# Patient Record
Sex: MALE | Race: WHITE | NOT HISPANIC OR LATINO
[De-identification: names, ages, dates, MRNs, and addresses within clinical notes are randomized per-mention and may not be internally consistent; named-entity substitution may affect disease eponyms.]

---

## 2020-04-26 ENCOUNTER — MESSAGE (OUTPATIENT)
Age: 55
End: 2020-04-26

## 2021-03-23 PROBLEM — Z00.00 ENCOUNTER FOR PREVENTIVE HEALTH EXAMINATION: Status: ACTIVE | Noted: 2021-03-23

## 2021-03-31 ENCOUNTER — APPOINTMENT (OUTPATIENT)
Dept: PULMONOLOGY | Facility: CLINIC | Age: 56
End: 2021-03-31
Payer: COMMERCIAL

## 2021-03-31 ENCOUNTER — NON-APPOINTMENT (OUTPATIENT)
Age: 56
End: 2021-03-31

## 2021-03-31 VITALS
HEIGHT: 70 IN | SYSTOLIC BLOOD PRESSURE: 129 MMHG | HEART RATE: 77 BPM | BODY MASS INDEX: 1.43 KG/M2 | WEIGHT: 10 LBS | DIASTOLIC BLOOD PRESSURE: 85 MMHG | RESPIRATION RATE: 16 BRPM | TEMPERATURE: 98.3 F

## 2021-03-31 DIAGNOSIS — R06.83 SNORING: ICD-10-CM

## 2021-03-31 DIAGNOSIS — Z81.8 FAMILY HISTORY OF OTHER MENTAL AND BEHAVIORAL DISORDERS: ICD-10-CM

## 2021-03-31 DIAGNOSIS — F51.04 PSYCHOPHYSIOLOGIC INSOMNIA: ICD-10-CM

## 2021-03-31 DIAGNOSIS — Z86.59 PERSONAL HISTORY OF OTHER MENTAL AND BEHAVIORAL DISORDERS: ICD-10-CM

## 2021-03-31 DIAGNOSIS — Z83.79 FAMILY HISTORY OF OTHER DISEASES OF THE DIGESTIVE SYSTEM: ICD-10-CM

## 2021-03-31 DIAGNOSIS — F41.1 GENERALIZED ANXIETY DISORDER: ICD-10-CM

## 2021-03-31 DIAGNOSIS — Z82.49 FAMILY HISTORY OF ISCHEMIC HEART DISEASE AND OTHER DISEASES OF THE CIRCULATORY SYSTEM: ICD-10-CM

## 2021-03-31 PROCEDURE — 99072 ADDL SUPL MATRL&STAF TM PHE: CPT

## 2021-03-31 PROCEDURE — 99204 OFFICE O/P NEW MOD 45 MIN: CPT | Mod: GC

## 2021-03-31 RX ORDER — FINASTERIDE 5 MG/1
5 TABLET, FILM COATED ORAL
Refills: 0 | Status: ACTIVE | COMMUNITY

## 2021-03-31 RX ORDER — ROSUVASTATIN CALCIUM 5 MG/1
TABLET, FILM COATED ORAL
Refills: 0 | Status: ACTIVE | COMMUNITY

## 2021-03-31 RX ORDER — DULOXETINE HYDROCHLORIDE 60 MG/1
60 CAPSULE, DELAYED RELEASE ORAL
Refills: 0 | Status: ACTIVE | COMMUNITY

## 2021-03-31 RX ORDER — ALPRAZOLAM 2 MG/1
2 TABLET, EXTENDED RELEASE ORAL
Refills: 0 | Status: ACTIVE | COMMUNITY

## 2021-03-31 RX ORDER — PRAZOSIN HYDROCHLORIDE 5 MG/1
CAPSULE ORAL
Refills: 0 | Status: ACTIVE | COMMUNITY

## 2021-03-31 RX ORDER — ALPRAZOLAM 1 MG/1
1 TABLET ORAL
Refills: 0 | Status: ACTIVE | COMMUNITY

## 2021-03-31 NOTE — PHYSICAL EXAM
[General Appearance - Well Developed] : well developed [General Appearance - Well Nourished] : well nourished [Neck Appearance] : the appearance of the neck was normal [Heart Rate And Rhythm] : heart rate was normal and rhythm regular [Heart Sounds] : normal S1 and S2 [Auscultation Breath Sounds / Voice Sounds] : lungs were clear to auscultation bilaterally [] : no respiratory distress [Abnormal Walk] : normal gait [Nail Clubbing] : no clubbing of the fingernails [Cyanosis, Localized] : no localized cyanosis [Skin Color & Pigmentation] : normal skin color and pigmentation [Motor Exam] : the motor exam was normal [No Focal Deficits] : no focal deficits [Oriented To Time, Place, And Person] : oriented to person, place, and time [Mood] : the mood was normal

## 2021-04-01 PROBLEM — F51.04 CHRONIC INSOMNIA: Status: ACTIVE | Noted: 2021-03-31

## 2021-04-01 RX ORDER — LEMBOREXANT 5 MG/1
5 TABLET, FILM COATED ORAL
Qty: 10 | Refills: 0 | Status: ACTIVE | COMMUNITY
Start: 2021-04-01 | End: 1900-01-01

## 2021-04-01 NOTE — ASSESSMENT
[FreeTextEntry1] : 55M hx chronic insomnia, HYUN, depression, who comes for initial evaluation. 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 Also c/o PTSD nightmares which occur at the end of the night. He has been seen by Dr. Nile Sotelo (Psychiatry), who has been treating his anxiety and depression. Currently he is on cymbalta 60mg, belsomra 20 mg, prazosin 6mg and xanax. Pt finds Belsomra 20mg extremely helpful to help him fall asleep. In the past, tried doxepin and trazodone but did not help. Given confusional arousals and night terrors would avoid "Z drugs". Additionally would avoid other drugs that may interact with his current regimen of xanax, cymbalta and prazosin including trazodone and mirtazapine (both of which can increase serotonin levels). No additional benzodiazepines should be given at this time since patient is already on xanax. Belsomra is ideal choice as it does not interact significantly with his current regimen, specifically targets orexin receptors, and has little adverse effects. At this time, d/w pt CBTi with Dr. Quintero would be helpful in both chronic insomnia and PTSD nightmares  - he is reluctant to pursue because of insurance coverage issues for CBTi  - he was also provided with the name of an alternative CBTi provider in the region who may accept his insurance. Will reorder Belsomra 20mg at bedtime. If not accepted by insurance, will try dayvigo. he will conitnue with ptsd management as per Dr. Sotelo

## 2021-04-01 NOTE — REVIEW OF SYSTEMS
[Depression] : depression [Anxious] : anxious [Difficulty Initiating Sleep] : difficulty falling asleep [Difficulty Maintaining Sleep] : difficulty maintaining sleep [Unusual Sleep Behavior] : unusual sleep behavior [Negative] : Musculoskeletal [Lower Extremity Discomfort] : no lower extremity discomfort [Late day/ Evening symptoms] : no late day/evening symptoms [Hypersomnolence] : not sleeping much more than usual [Cataplexy] :  no cataplexy

## 2021-04-01 NOTE — HISTORY OF PRESENT ILLNESS
[FreeTextEntry1] : 55M hx chronic insomnia, HYUN, depression, who comes for initial evaluation. Referred by Dr. Sotelo (psychiatry). 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 Nightmares happen early in the morning as he is about to wake up and usually revolve around work-related issues. He has been seeing Dr. Sotelo for anxiety and depression and currently on cymbalta 60mg and xanax ER 2mg as well as prazosin 6mg for his PTSD. For his chronic insomnia, he has been prescribed belsomra 20 mg, which works very well for him. In the past he has tried doxepin, trazodone and klonopin but all of these did not work well for him. Pt also describes having night terrors where he has felt partly awake. He has sat up from nightmares before. No injuries reported during sleep. \par \par Sleep schedule: 9PM-11PM to bed, prolonged sleep latency; PTSD nightmares every night - usually prior to waking up in the morning, 1-3 nighttime awakenings due to nocturia - able to fall back asleep; wakes up 7AM. +snores.

## 2021-04-19 ENCOUNTER — APPOINTMENT (OUTPATIENT)
Dept: PULMONOLOGY | Facility: CLINIC | Age: 56
End: 2021-04-19
Payer: SELF-PAY

## 2021-04-19 PROCEDURE — 90791 PSYCH DIAGNOSTIC EVALUATION: CPT | Mod: 95

## 2021-04-23 RX ORDER — SUVOREXANT 20 MG/1
20 TABLET, FILM COATED ORAL
Qty: 90 | Refills: 0 | Status: ACTIVE | COMMUNITY
Start: 2021-03-31

## 2021-04-23 RX ORDER — SUVOREXANT 20 MG/1
20 TABLET, FILM COATED ORAL
Refills: 0 | Status: COMPLETED | COMMUNITY
End: 2021-04-23

## 2021-05-05 ENCOUNTER — APPOINTMENT (OUTPATIENT)
Dept: PULMONOLOGY | Facility: CLINIC | Age: 56
End: 2021-05-05
Payer: SELF-PAY

## 2021-05-05 PROCEDURE — 90834 PSYTX W PT 45 MINUTES: CPT | Mod: 95

## 2021-05-19 ENCOUNTER — APPOINTMENT (OUTPATIENT)
Dept: PULMONOLOGY | Facility: CLINIC | Age: 56
End: 2021-05-19
Payer: SELF-PAY

## 2021-05-19 PROCEDURE — 90834 PSYTX W PT 45 MINUTES: CPT | Mod: 95

## 2021-06-02 ENCOUNTER — APPOINTMENT (OUTPATIENT)
Dept: PULMONOLOGY | Facility: CLINIC | Age: 56
End: 2021-06-02
Payer: SELF-PAY

## 2021-06-02 PROCEDURE — 90834 PSYTX W PT 45 MINUTES: CPT | Mod: 95

## 2021-06-14 ENCOUNTER — APPOINTMENT (OUTPATIENT)
Dept: PULMONOLOGY | Facility: CLINIC | Age: 56
End: 2021-06-14
Payer: SELF-PAY

## 2021-06-14 PROCEDURE — 90834 PSYTX W PT 45 MINUTES: CPT | Mod: 95

## 2021-06-28 ENCOUNTER — APPOINTMENT (OUTPATIENT)
Dept: PULMONOLOGY | Facility: CLINIC | Age: 56
End: 2021-06-28
Payer: SELF-PAY

## 2021-06-28 PROCEDURE — 90834 PSYTX W PT 45 MINUTES: CPT | Mod: 95

## 2021-07-12 ENCOUNTER — APPOINTMENT (OUTPATIENT)
Dept: PULMONOLOGY | Facility: CLINIC | Age: 56
End: 2021-07-12

## 2021-07-16 ENCOUNTER — APPOINTMENT (OUTPATIENT)
Dept: PULMONOLOGY | Facility: CLINIC | Age: 56
End: 2021-07-16
Payer: SELF-PAY

## 2021-07-16 PROCEDURE — 90834 PSYTX W PT 45 MINUTES: CPT | Mod: 95

## 2021-07-30 ENCOUNTER — APPOINTMENT (OUTPATIENT)
Dept: PULMONOLOGY | Facility: CLINIC | Age: 56
End: 2021-07-30
Payer: SELF-PAY

## 2021-07-30 PROCEDURE — 90834 PSYTX W PT 45 MINUTES: CPT | Mod: 95

## 2021-08-20 ENCOUNTER — APPOINTMENT (OUTPATIENT)
Dept: PULMONOLOGY | Facility: CLINIC | Age: 56
End: 2021-08-20
Payer: SELF-PAY

## 2021-08-20 PROCEDURE — 90834 PSYTX W PT 45 MINUTES: CPT | Mod: 95

## 2021-09-03 ENCOUNTER — APPOINTMENT (OUTPATIENT)
Dept: PULMONOLOGY | Facility: CLINIC | Age: 56
End: 2021-09-03
Payer: SELF-PAY

## 2021-09-03 PROCEDURE — 90834 PSYTX W PT 45 MINUTES: CPT | Mod: 95

## 2021-09-17 ENCOUNTER — APPOINTMENT (OUTPATIENT)
Dept: PULMONOLOGY | Facility: CLINIC | Age: 56
End: 2021-09-17
Payer: SELF-PAY

## 2021-09-17 PROCEDURE — 90834 PSYTX W PT 45 MINUTES: CPT | Mod: 95

## 2021-09-18 NOTE — REASON FOR VISIT
[Home] : at home, [unfilled] , at the time of the visit. [Other Location: e.g. Home (Enter Location, City,State)___] : at [unfilled] [Verbal consent obtained from patient] : the patient, [unfilled] [Follow-Up] : a follow-up visit [FreeTextEntry1] : anxiety, insomnia, nightmares

## 2021-09-18 NOTE — HISTORY OF PRESENT ILLNESS
[FreeTextEntry1] : 56M hx chronic insomnia, HYUN, depression, c/o insomnia and frequent nightmares. Referred by Dr. Pierson. Pt describes "suffering with PTSD-like nightmares" for many years and has difficulty falling and staying asleep. 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 He has been seeing Dr. Sotelo for anxiety and depression- tried on cymbalta 60mg, up to Prozac 40 mg, recently nortriptyline 10 mg- all d/c due to reported ineffectiveness in txing anxiety and depression and side effects. He takes xanax ER 2mg, prn and was recently tapered down to 5 mg prazosin from 6mg for PTSD. For chronic insomnia, he has been prescribed belsomra 20 mg by Dr. Pierson, which works very well for him. In the past he has tried doxepin, trazodone and klonopin but all of these did not work well for sleep. Pt also describes having "night terrors" where he has sat up with heart racing, feeling as though he is partly awake. No injuries reported during sleep. \par \par Sleep schedule: Initially, pt got into bed around 9PM, sometimes later, watches "brainless" TV shows, prolonged sleep latency that has improved to <30 min with Belsomra; TV still on after sleep onset and either pt or wife will turn it off, wears an eye mask, almost nightly PTSD nightmares - usually prior to waking up in the morning, 1-3 nighttime awakenings due to nocturia - able to fall back asleep; wakes up 7-8:30 AM, TST 7-9 hrs. +snores but denies witnessed apneas and daytime sleepiness.\par CBTi recommendations: Delay getting into bed 11 PM, turn TV off and OOB when awake <8 AM. Dec or abstain ETOH.\par F/u: Sleep variables have improved - TST 7-8 hrs, decrease in nightmares to several times per week from several times nightly- difficulty complying w getting into bed ~11 PM, sleeping w/out TV and alcohol abstainance. Pt has found  Exposure, Rescripting, Relaxation Treatment ERRT (rescripting typical nightmare and using relaxation exercises) helpful in reducing frequency and intensity of nightmares. \par

## 2021-09-18 NOTE — ASSESSMENT
[FreeTextEntry1] : Time: 10:02 - 10:54 AM\par Pt describes d/c Nortriptyline 50 mg due to increased mood lability (e.g., inc crying, sadness) and weight gain, started Zoloft 25 mg past two days. Pt has been reading rescripted nightmare narrative 2xs/day- reports intense emotion associated with it but finds relaxation exercises helpful- has noticed significant improvement in nightmare intensity and frequency. He feels more resolved about work situation- although it will always be a scar. 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 \par Pt displayed emotional lability during session regarding past situations that he feels guilt and confusion about. He continues to explore these conflicts with Dr. Sotelo. 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 \par Discussed pt's tendency to think in absolute and extreme terms ("all or none", "perfect" or failure, A or F grade)- pt describes more pressure due to "fear of failure". Describes early childhood experience of gym, describes self as not good in sports and teacher compared pt to his brothers, longstanding issues of lack of confidence, recalls being in nurse's office (unsure of age) and describing that he wanted to kill himself- he recalls that his mother wanted him to "get over it" and saw a therapist several times, recalls doing a rorshach and during therapy session with mother, pt describes saying to his mother that the problem might be her. Pt also recalls being very attached to his mother and that she would  "creep out of the  room" so he wouldn't get upset. \par Sleep log: In bed: 10 PM with occasional 9:30-9:45 PM, L/O 10:00- 11 PM, <30 min sleep latency, 2-3 awakenings, final awakening 4:43-8:30 AM but usually up earlier ~7 AM. NM 3-4 severity where 5 is most severe. "Ideations" before bedtime on 3/7 nts but have decreased in severity and intensity. Denies suicidal plan. ETOH~60% of days in past two weeks with clear relationship between inc nightmares with ETOH use.\par  Plan\par 1) Read rescripted narrative 1-2 xs/day \par 2) Reinforced  sleep/wake habits that you are trying to change (in bed too early, alcohol use, getting OOB when awake)\par 3) Fill out the nightmare log and review ideations\par 4) Relaxation exercises\par 5) F/u 2 wks.

## 2021-10-01 ENCOUNTER — APPOINTMENT (OUTPATIENT)
Dept: PULMONOLOGY | Facility: CLINIC | Age: 56
End: 2021-10-01
Payer: SELF-PAY

## 2021-10-01 PROCEDURE — 90834 PSYTX W PT 45 MINUTES: CPT | Mod: 95

## 2021-10-15 ENCOUNTER — APPOINTMENT (OUTPATIENT)
Dept: PULMONOLOGY | Facility: CLINIC | Age: 56
End: 2021-10-15
Payer: SELF-PAY

## 2021-10-15 PROCEDURE — 90834 PSYTX W PT 45 MINUTES: CPT | Mod: 95

## 2021-10-29 ENCOUNTER — APPOINTMENT (OUTPATIENT)
Dept: PULMONOLOGY | Facility: CLINIC | Age: 56
End: 2021-10-29
Payer: SELF-PAY

## 2021-10-29 PROCEDURE — 90834 PSYTX W PT 45 MINUTES: CPT | Mod: 95

## 2021-11-12 ENCOUNTER — APPOINTMENT (OUTPATIENT)
Dept: PULMONOLOGY | Facility: CLINIC | Age: 56
End: 2021-11-12
Payer: SELF-PAY

## 2021-11-12 PROCEDURE — 90834 PSYTX W PT 45 MINUTES: CPT | Mod: 95

## 2021-12-03 ENCOUNTER — APPOINTMENT (OUTPATIENT)
Dept: PULMONOLOGY | Facility: CLINIC | Age: 56
End: 2021-12-03
Payer: SELF-PAY

## 2021-12-03 PROCEDURE — 90834 PSYTX W PT 45 MINUTES: CPT | Mod: 95

## 2022-01-14 ENCOUNTER — APPOINTMENT (OUTPATIENT)
Dept: PULMONOLOGY | Facility: CLINIC | Age: 57
End: 2022-01-14
Payer: SELF-PAY

## 2022-01-14 PROCEDURE — 90834 PSYTX W PT 45 MINUTES: CPT | Mod: 95

## 2024-04-30 ENCOUNTER — APPOINTMENT (OUTPATIENT)
Dept: PODIATRY | Facility: CLINIC | Age: 59
End: 2024-04-30
Payer: COMMERCIAL

## 2024-04-30 DIAGNOSIS — Z80.8 FAMILY HISTORY OF MALIGNANT NEOPLASM OF OTHER ORGANS OR SYSTEMS: ICD-10-CM

## 2024-04-30 DIAGNOSIS — B35.1 TINEA UNGUIUM: ICD-10-CM

## 2024-04-30 DIAGNOSIS — M79.672 PAIN IN LEFT FOOT: ICD-10-CM

## 2024-04-30 DIAGNOSIS — F41.9 ANXIETY DISORDER, UNSPECIFIED: ICD-10-CM

## 2024-04-30 DIAGNOSIS — Z82.49 FAMILY HISTORY OF ISCHEMIC HEART DISEASE AND OTHER DISEASES OF THE CIRCULATORY SYSTEM: ICD-10-CM

## 2024-04-30 DIAGNOSIS — Z83.438 FAMILY HISTORY OF OTHER DISORDER OF LIPOPROTEIN METABOLISM AND OTHER LIPIDEMIA: ICD-10-CM

## 2024-04-30 DIAGNOSIS — F12.91 CANNABIS USE, UNSPECIFIED, IN REMISSION: ICD-10-CM

## 2024-04-30 PROCEDURE — 99203 OFFICE O/P NEW LOW 30 MIN: CPT

## 2024-04-30 RX ORDER — ACETAMINOPHEN 325 MG
TABLET ORAL
Refills: 0 | Status: ACTIVE | COMMUNITY

## 2024-04-30 RX ORDER — CEPHALEXIN 500 MG/1
500 CAPSULE ORAL 4 TIMES DAILY
Qty: 28 | Refills: 0 | Status: ACTIVE | COMMUNITY
Start: 2024-04-30 | End: 1900-01-01

## 2024-04-30 RX ORDER — GABAPENTIN 100 MG
100 TABLET ORAL
Refills: 0 | Status: ACTIVE | COMMUNITY

## 2024-04-30 RX ORDER — PRAZOSIN HYDROCHLORIDE 5 MG/1
CAPSULE ORAL
Refills: 0 | Status: ACTIVE | COMMUNITY

## 2024-04-30 RX ORDER — PROPRANOLOL HYDROCHLORIDE 80 MG/1
TABLET ORAL
Refills: 0 | Status: ACTIVE | COMMUNITY

## 2024-04-30 RX ORDER — SERTRALINE HYDROCHLORIDE 25 MG/1
25 TABLET, FILM COATED ORAL
Refills: 0 | Status: ACTIVE | COMMUNITY

## 2024-04-30 RX ORDER — FINASTERIDE 1 MG/1
TABLET, FILM COATED ORAL
Refills: 0 | Status: ACTIVE | COMMUNITY

## 2024-05-01 NOTE — PHYSICAL EXAM
[2+] : left foot dorsalis pedis 2+ [No Joint Swelling] : no joint swelling [Normal Foot/Ankle] : Both lower extremities were exposed and visualized. Standing exam demonstrates normal foot posture and alignment. Hindfoot exam shows no hindfoot valgus or varus [Skin Color & Pigmentation] : normal skin color and pigmentation [Skin Turgor] : normal skin turgor [Skin Lesions] : no skin lesions [Sensation] : the sensory exam was normal to light touch and pinprick [No Focal Deficits] : no focal deficits [Deep Tendon Reflexes (DTR)] : deep tendon reflexes were 2+ and symmetric [Motor Exam] : the motor exam was normal [General Appearance - Alert] : alert [Oriented To Time, Place, And Person] : oriented to person, place, and time [Ankle Swelling (On Exam)] : not present [Varicose Veins Of Lower Extremities] : not present [] : not present [Delayed in the Right Toes] : capillary refills normal in right toes [Delayed in the Left Toes] : capillary refills normal in the left toes [Foot Ulcer] : no foot ulcer [Skin Induration] : no skin induration [FreeTextEntry1] : Paronychia along the posterior nail fold of the left hallux nail.  There is no loosening of the nail.

## 2024-05-01 NOTE — ASSESSMENT
[FreeTextEntry1] : Impression: Onychomycosis, painful (B35.1) (M79.672).  Paronychia (L03.032) of the left ingrown nail (L60.0).    Treatment: Patient has a topical at home that he is using and is to continue to do so.  The nail has not loosened and therefore does not need to be pulled. Will attempt oral antibiotics first.  He was given a prescription for Keflex 500mg QID.  Will reevaluate in one week, if the paronychia persists.  Any questions or problems, patient is to contact the office.

## 2024-05-01 NOTE — HISTORY OF PRESENT ILLNESS
[FreeTextEntry1] : Patient presents today with a paronychia of the left hallux nail.  Patient has difficulty ambulating due to pain and discomfort.  He has a mild onychomycosis of the nail as well.

## 2024-05-07 ENCOUNTER — APPOINTMENT (OUTPATIENT)
Dept: PODIATRY | Facility: CLINIC | Age: 59
End: 2024-05-07
Payer: COMMERCIAL

## 2024-05-07 DIAGNOSIS — L03.032 CELLULITIS OF LEFT TOE: ICD-10-CM

## 2024-05-07 DIAGNOSIS — L60.0 INGROWING NAIL: ICD-10-CM

## 2024-05-07 PROCEDURE — 11730 AVULSION NAIL PLATE SIMPLE 1: CPT | Mod: TA

## 2024-05-07 RX ORDER — AMOXICILLIN AND CLAVULANATE POTASSIUM 875; 125 MG/1; MG/1
875-125 TABLET, COATED ORAL
Qty: 14 | Refills: 0 | Status: ACTIVE | COMMUNITY
Start: 2024-05-07 | End: 1900-01-01

## 2024-05-07 NOTE — PROCEDURE
[Other:____] : ~M [unfilled] [Left Foot] : on the left foot [Patient] : the patient [Risks] : risks [Benefits] : benefits [Alternatives] : alternatives [1%] : 1%  [Alcohol] : alcohol [27 gauge] : A 27 gauge needle was used [Tolerated Well] : tolerated the procedure well [No Complications] : There were no complications. [Instructions Given] : given handouts/patient instructions [Patient Instructed to Call] : instructed to call if redness at site, a decrease in range of motion or an increase in pain is noted after procedure.

## 2024-05-08 NOTE — HISTORY OF PRESENT ILLNESS
[FreeTextEntry1] : Patient presents today because the paronychia persists even after the Keflex and it is causing him pain.  There is paronychia along the whole posterior nail fold.  No other changes to his medical status.

## 2024-05-08 NOTE — ASSESSMENT
[Verbal] : verbal [Patient] : patient [Good - alert, interested, motivated] : Good - alert, interested, motivated [Dressing changes] : dressing changes [Signs and symptoms of infection] : sign and symptoms of infection [FreeTextEntry1] : Impression: Paronychia (L03.032) of the left ingrown nail (L60.0).    Recommendations: Total nail avulsion, left hallux nail posterior nail fold.  After discussing all risks, benefits and alternatives, patient consented.  Treatment:  The patient was placed in the supine position where a digital block of 1% Xylocaine was administered to the left hallux consisting of 3cc.  The left hallux was then prepped and draped in the usual sterile manner.  Under strict sterile technique, the nail was freed from the surrounding nail folds.  Then utilizing an English anvil nail splitter, the nail was removed in toto.  The remaining nail bed was inspected and noted to be free of any spicules.  Area was irrigated and cleansed.  Antibiotic, dry, sterile dressing was applied.  Patient was given verbal and written soaking post-operative instructions.  Patient tolerated the procedure and the anesthesia well with no apparent complications.  Patient was given a prescription for Augmentin 875mg BID x 7 days.  Will reevaluate in one week.  Any questions or problems, patient is to contact the office.

## 2024-05-08 NOTE — PHYSICAL EXAM
[2+] : left foot dorsalis pedis 2+ [No Joint Swelling] : no joint swelling [Normal Foot/Ankle] : Both lower extremities were exposed and visualized. Standing exam demonstrates normal foot posture and alignment. Hindfoot exam shows no hindfoot valgus or varus [Skin Turgor] : normal skin turgor [Sensation] : the sensory exam was normal to light touch and pinprick [No Focal Deficits] : no focal deficits [Deep Tendon Reflexes (DTR)] : deep tendon reflexes were 2+ and symmetric [Motor Exam] : the motor exam was normal [General Appearance - Alert] : alert [Skin Color & Pigmentation] : normal skin color and pigmentation [Skin Lesions] : no skin lesions [Oriented To Time, Place, And Person] : oriented to person, place, and time [Ankle Swelling (On Exam)] : not present [Varicose Veins Of Lower Extremities] : not present [] : not present [Delayed in the Right Toes] : capillary refills normal in right toes [Delayed in the Left Toes] : capillary refills normal in the left toes [Foot Ulcer] : no foot ulcer [Skin Induration] : no skin induration [FreeTextEntry1] : Paronychia along the posterior nail fold of the left hallux nail.  There is no loosening of the nail.

## 2024-08-19 RX ORDER — EFINACONAZOLE 100 MG/ML
10 SOLUTION TOPICAL
Qty: 1 | Refills: 4 | Status: ACTIVE | COMMUNITY
Start: 2024-08-19 | End: 1900-01-01